# Patient Record
Sex: MALE | Race: BLACK OR AFRICAN AMERICAN | NOT HISPANIC OR LATINO | URBAN - METROPOLITAN AREA
[De-identification: names, ages, dates, MRNs, and addresses within clinical notes are randomized per-mention and may not be internally consistent; named-entity substitution may affect disease eponyms.]

---

## 2020-03-08 ENCOUNTER — INPATIENT (INPATIENT)
Facility: HOSPITAL | Age: 65
LOS: 0 days | Discharge: HOME | End: 2020-03-09
Attending: FAMILY MEDICINE | Admitting: FAMILY MEDICINE
Payer: COMMERCIAL

## 2020-03-08 VITALS
DIASTOLIC BLOOD PRESSURE: 90 MMHG | TEMPERATURE: 98 F | HEART RATE: 91 BPM | OXYGEN SATURATION: 97 % | RESPIRATION RATE: 18 BRPM | SYSTOLIC BLOOD PRESSURE: 152 MMHG | WEIGHT: 240.08 LBS

## 2020-03-08 LAB
ALBUMIN SERPL ELPH-MCNC: 4.3 G/DL — SIGNIFICANT CHANGE UP (ref 3.5–5.2)
ALP SERPL-CCNC: 74 U/L — SIGNIFICANT CHANGE UP (ref 30–115)
ALT FLD-CCNC: 14 U/L — SIGNIFICANT CHANGE UP (ref 0–41)
ANION GAP SERPL CALC-SCNC: 14 MMOL/L — SIGNIFICANT CHANGE UP (ref 7–14)
AST SERPL-CCNC: 15 U/L — SIGNIFICANT CHANGE UP (ref 0–41)
BASE EXCESS BLDV CALC-SCNC: 0.6 MMOL/L — SIGNIFICANT CHANGE UP (ref -2–2)
BILIRUB SERPL-MCNC: 0.6 MG/DL — SIGNIFICANT CHANGE UP (ref 0.2–1.2)
BUN SERPL-MCNC: 11 MG/DL — SIGNIFICANT CHANGE UP (ref 10–20)
CA-I SERPL-SCNC: 1.12 MMOL/L — SIGNIFICANT CHANGE UP (ref 1.12–1.3)
CALCIUM SERPL-MCNC: 8.8 MG/DL — SIGNIFICANT CHANGE UP (ref 8.5–10.1)
CHLORIDE SERPL-SCNC: 105 MMOL/L — SIGNIFICANT CHANGE UP (ref 98–110)
CO2 SERPL-SCNC: 21 MMOL/L — SIGNIFICANT CHANGE UP (ref 17–32)
CREAT SERPL-MCNC: 0.9 MG/DL — SIGNIFICANT CHANGE UP (ref 0.7–1.5)
FLU A RESULT: NEGATIVE — SIGNIFICANT CHANGE UP
FLU A RESULT: NEGATIVE — SIGNIFICANT CHANGE UP
FLUAV AG NPH QL: NEGATIVE — SIGNIFICANT CHANGE UP
FLUBV AG NPH QL: NEGATIVE — SIGNIFICANT CHANGE UP
GAS PNL BLDV: 140 MMOL/L — SIGNIFICANT CHANGE UP (ref 136–145)
GAS PNL BLDV: SIGNIFICANT CHANGE UP
GLUCOSE SERPL-MCNC: 102 MG/DL — HIGH (ref 70–99)
HCO3 BLDV-SCNC: 25 MMOL/L — SIGNIFICANT CHANGE UP (ref 22–29)
HCT VFR BLD CALC: 47 % — SIGNIFICANT CHANGE UP (ref 42–52)
HCT VFR BLDA CALC: 51.7 % — HIGH (ref 34–44)
HGB BLD CALC-MCNC: 16.9 G/DL — SIGNIFICANT CHANGE UP (ref 14–18)
HGB BLD-MCNC: 16 G/DL — SIGNIFICANT CHANGE UP (ref 14–18)
LACTATE BLDV-MCNC: 1.4 MMOL/L — SIGNIFICANT CHANGE UP (ref 0.5–1.6)
MCHC RBC-ENTMCNC: 30.2 PG — SIGNIFICANT CHANGE UP (ref 27–31)
MCHC RBC-ENTMCNC: 34 G/DL — SIGNIFICANT CHANGE UP (ref 32–37)
MCV RBC AUTO: 88.7 FL — SIGNIFICANT CHANGE UP (ref 80–94)
NRBC # BLD: 0 /100 WBCS — SIGNIFICANT CHANGE UP (ref 0–0)
PCO2 BLDV: 38 MMHG — LOW (ref 41–51)
PH BLDV: 7.42 — SIGNIFICANT CHANGE UP (ref 7.26–7.43)
PLATELET # BLD AUTO: 239 K/UL — SIGNIFICANT CHANGE UP (ref 130–400)
PO2 BLDV: 107 MMHG — HIGH (ref 20–40)
POTASSIUM BLDV-SCNC: 3.5 MMOL/L — SIGNIFICANT CHANGE UP (ref 3.3–5.6)
POTASSIUM SERPL-MCNC: 3.8 MMOL/L — SIGNIFICANT CHANGE UP (ref 3.5–5)
POTASSIUM SERPL-SCNC: 3.8 MMOL/L — SIGNIFICANT CHANGE UP (ref 3.5–5)
PROT SERPL-MCNC: 7.3 G/DL — SIGNIFICANT CHANGE UP (ref 6–8)
RBC # BLD: 5.3 M/UL — SIGNIFICANT CHANGE UP (ref 4.7–6.1)
RBC # FLD: 13.5 % — SIGNIFICANT CHANGE UP (ref 11.5–14.5)
RSV RESULT: NEGATIVE — SIGNIFICANT CHANGE UP
RSV RNA RESP QL NAA+PROBE: NEGATIVE — SIGNIFICANT CHANGE UP
SAO2 % BLDV: 98 % — SIGNIFICANT CHANGE UP
SODIUM SERPL-SCNC: 140 MMOL/L — SIGNIFICANT CHANGE UP (ref 135–146)
TROPONIN T SERPL-MCNC: <0.01 NG/ML — SIGNIFICANT CHANGE UP
WBC # BLD: 5.3 K/UL — SIGNIFICANT CHANGE UP (ref 4.8–10.8)
WBC # FLD AUTO: 5.3 K/UL — SIGNIFICANT CHANGE UP (ref 4.8–10.8)

## 2020-03-08 PROCEDURE — 93010 ELECTROCARDIOGRAM REPORT: CPT

## 2020-03-08 PROCEDURE — 99222 1ST HOSP IP/OBS MODERATE 55: CPT | Mod: GC,AI

## 2020-03-08 PROCEDURE — 71046 X-RAY EXAM CHEST 2 VIEWS: CPT | Mod: 26

## 2020-03-08 PROCEDURE — 99285 EMERGENCY DEPT VISIT HI MDM: CPT

## 2020-03-08 RX ORDER — IPRATROPIUM/ALBUTEROL SULFATE 18-103MCG
3 AEROSOL WITH ADAPTER (GRAM) INHALATION EVERY 6 HOURS
Refills: 0 | Status: DISCONTINUED | OUTPATIENT
Start: 2020-03-08 | End: 2020-03-09

## 2020-03-08 RX ORDER — ENOXAPARIN SODIUM 100 MG/ML
40 INJECTION SUBCUTANEOUS DAILY
Refills: 0 | Status: DISCONTINUED | OUTPATIENT
Start: 2020-03-08 | End: 2020-03-09

## 2020-03-08 RX ORDER — INFLUENZA VIRUS VACCINE 15; 15; 15; 15 UG/.5ML; UG/.5ML; UG/.5ML; UG/.5ML
0.5 SUSPENSION INTRAMUSCULAR ONCE
Refills: 0 | Status: COMPLETED | OUTPATIENT
Start: 2020-03-08 | End: 2020-03-08

## 2020-03-08 RX ORDER — IPRATROPIUM/ALBUTEROL SULFATE 18-103MCG
3 AEROSOL WITH ADAPTER (GRAM) INHALATION ONCE
Refills: 0 | Status: COMPLETED | OUTPATIENT
Start: 2020-03-08 | End: 2020-03-08

## 2020-03-08 RX ORDER — MAGNESIUM SULFATE 500 MG/ML
2 VIAL (ML) INJECTION ONCE
Refills: 0 | Status: COMPLETED | OUTPATIENT
Start: 2020-03-08 | End: 2020-03-08

## 2020-03-08 RX ORDER — LOSARTAN POTASSIUM 100 MG/1
100 TABLET, FILM COATED ORAL DAILY
Refills: 0 | Status: DISCONTINUED | OUTPATIENT
Start: 2020-03-08 | End: 2020-03-09

## 2020-03-08 RX ORDER — PANTOPRAZOLE SODIUM 20 MG/1
40 TABLET, DELAYED RELEASE ORAL
Refills: 0 | Status: DISCONTINUED | OUTPATIENT
Start: 2020-03-08 | End: 2020-03-09

## 2020-03-08 RX ORDER — LOSARTAN POTASSIUM 100 MG/1
1 TABLET, FILM COATED ORAL
Qty: 0 | Refills: 0 | DISCHARGE

## 2020-03-08 RX ADMIN — Medication 125 MILLIGRAM(S): at 06:09

## 2020-03-08 RX ADMIN — Medication 3 MILLILITER(S): at 21:02

## 2020-03-08 RX ADMIN — Medication 3 MILLILITER(S): at 06:05

## 2020-03-08 RX ADMIN — Medication 3 MILLILITER(S): at 05:35

## 2020-03-08 RX ADMIN — Medication 3 MILLILITER(S): at 06:42

## 2020-03-08 RX ADMIN — Medication 3 MILLILITER(S): at 05:50

## 2020-03-08 RX ADMIN — Medication 50 GRAM(S): at 08:37

## 2020-03-08 RX ADMIN — Medication 60 MILLIGRAM(S): at 17:15

## 2020-03-08 NOTE — H&P ADULT - NSHPPHYSICALEXAM_GEN_ALL_CORE
GENERAL: NAD, lying in bed comfortably  HEAD: Atraumatic, Normocephalic  EYES: EOMI, PERRLA, conjunctiva pink and cornea white  ENT: Normal external ears and nose, no discharges; moist mucous membranes, no erythema on posterior oropharynx  NECK: Supple, nontender to palpation; no JVD  CHEST/LUNG: Clear to auscultation bilaterally; No rales, rhonchi, wheezing, or rubs. Unlabored respirations  HEART: Regular rate and rhythm; No murmurs, rubs, or gallops  ABDOMEN: Soft, nontender, nondistended; no rebound tenderness, no guarding; no hepatomegaly; normoactive/hyperactive/hypoactive bowel sounds  EXTREMITIES:  2+ Peripheral Pulses, brisk capillary refill. No clubbing, cyanosis, or petal edema  NERVOUS SYSTEM: Alert and oriented to person, time, place and situation, speech clear. No focal deficits   MSK: FROM all 4 extremities, full and equal strength  SKIN: No rashes or lesions GENERAL: NAD, lying in bed comfortably  HEAD: Atraumatic, Normocephalic  EYES: EOMI, PERRLA, conjunctiva pink and cornea white  ENT: Normal external ears and nose, no discharges; moist mucous membranes, no erythema on posterior oropharynx  NECK: Supple, nontender to palpation; no JVD  CHEST/LUNG: Wheezing bilaterally  HEART: Regular rate and rhythm; No murmurs, rubs, or gallops  ABDOMEN: Obese abdomen, soft, nontender, nondistended; no rebound tenderness, no guarding; no hepatomegaly; normoactive bowel sounds  EXTREMITIES:  2+ Peripheral Pulses, brisk capillary refill. No clubbing, cyanosis, or petal edema; black vertical stripe on right middle finger  NERVOUS SYSTEM: Alert and oriented to person, time, place and situation, speech clear. No focal deficits   MSK: FROM all 4 extremities, full and equal strength  SKIN: No rashes or lesions

## 2020-03-08 NOTE — ED PROVIDER NOTE - CLINICAL SUMMARY MEDICAL DECISION MAKING FREE TEXT BOX
pt seen for wheezing and SOB x 3 days, pt improved with nebs and steroids but continued to wheeze diffusely, admitted for further treatment

## 2020-03-08 NOTE — ED ADULT NURSE NOTE - OBJECTIVE STATEMENT
md· HPI Objective Statement: 64y M w/ PMH of asthma presents with SOB for 3 days. States has been having cough for the past 3 days. Steadily developed SOB that was worse tonight. Called EMS. EMS gave 1 combi en route. Denies fever, chills, nasal congestion, sore throat, CP, abd pain, n/v/d, or numbness/tingling.

## 2020-03-08 NOTE — H&P ADULT - NSHPREVIEWOFSYSTEMS_GEN_ALL_CORE
CONSTITUTIONAL: No unintentional weight loss; no weakness; no fevers or chills  RESPIRATORY: No cough, wheezing, hemoptysis; no shortness of breath/shortness of breath on exertion; no orthopnea  CARDIOVASCULAR: No chest pain or palpitations  GASTROINTESTINAL: No abdominal or epigastric pain; no change in appetite; no early satiety; no nausea, vomiting, or hematemesis; no diarrhea or constipation; no melena or hematochezia; no change of stool caliber  GENITOURINARY: No dysuria, increased in urinary frequency or hematuria; no urethral discharge  NEUROLOGICAL: No headache/dizziness; no focal numbness or motor weakness  SKIN: No itching, rashes; no recent insect bites CONSTITUTIONAL: No unintentional weight loss; no weakness; no fevers or chills  RESPIRATORY: Reports cough, wheezing, and shortness of breath; no hemoptysis, no orthopnea  CARDIOVASCULAR: No chest pain or palpitations  GASTROINTESTINAL: No abdominal or epigastric pain; no change in appetite; no early satiety; no nausea, vomiting, or hematemesis; no diarrhea or constipation; no melena or hematochezia; no change of stool caliber  GENITOURINARY: No dysuria, increased in urinary frequency or hematuria; no urethral discharge  NEUROLOGICAL: No headache/dizziness; no focal numbness or motor weakness  SKIN: No itching, rashes; no recent insect bites

## 2020-03-08 NOTE — ED PROVIDER NOTE - NS ED ROS FT
Eyes:  No visual changes, eye pain or discharge.  ENMT:  No hearing changes, pain, no sore throat or runny nose, no difficulty swallowing  Cardiac:  No chest pain, SOB or edema. No chest pain with exertion.  Respiratory:  No respiratory distress. No hemoptysis. + history of asthma, cough  GI:  No nausea, vomiting, diarrhea or abdominal pain.  :  No dysuria, frequency or burning.  MS:  No myalgia, muscle weakness, joint pain or back pain.  Neuro:  No headache or weakness.  No LOC.  Skin:  No skin rash.   Endocrine: No history of thyroid disease or diabetes.

## 2020-03-08 NOTE — H&P ADULT - HISTORY OF PRESENT ILLNESS
64y M w/ PMH of asthma presents with SOB for 3 days. States has been having cough for the past 3 days. Steadily developed SOB that was worse tonight. Called EMS. EMS gave 1 combi en route. Denies fever, chills, nasal congestion, sore throat, CP, abd pain, n/v/d, or numbness/tingling.       T(C): 36.7 (08 Mar 2020 08:07), Max: 36.8 (08 Mar 2020 05:10)  T(F): 98 (08 Mar 2020 08:07), Max: 98.2 (08 Mar 2020 05:10)  HR: 87 (08 Mar 2020 08:07) (87 - 91)  BP: 150/90 (08 Mar 2020 08:07) (150/90 - 152/94)  RR: 17 (08 Mar 2020 08:07) (16 - 18)  SpO2: 98% (08 Mar 2020 08:07) (97% - 98%) 64 years old male from home with PMHx of asthma and HTN, presents with SOB for 3 days. Patient was well until Friday night, when he started to having difficulty breathing and mildly productive cough, with clear sputum, associated with wheezing and abdominal pain from excessive coughing. Patient denies fever and chills, chest pain, palpitation, n/v/d/c, urinary symptoms or leg swelling. Patient was not born with asthma, never hospitalized or intubated for asthma, and not steroid dependent. His last asthma exacerbation was 1 year and half ago and treated in Urgent care. He reports increased albuterol use for the past 3 days (usually use it 2-3 times per week but now almost around the clock, no nightly symptoms).    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 He could not tell if any passengers were sick, but none of his co-workers were. He also did not travel to any endemic area in the past 2 weeks. No pets at home.      T(C): 36.7 (08 Mar 2020 08:07), Max: 36.8 (08 Mar 2020 05:10)  T(F): 98 (08 Mar 2020 08:07), Max: 98.2 (08 Mar 2020 05:10)  HR: 87 (08 Mar 2020 08:07) (87 - 91)  BP: 150/90 (08 Mar 2020 08:07) (150/90 - 152/94)  RR: 17 (08 Mar 2020 08:07) (16 - 18)  SpO2: 98% (08 Mar 2020 08:07) (97% - 98%)

## 2020-03-08 NOTE — ED PROVIDER NOTE - OBJECTIVE STATEMENT
64y M w/ PMH of asthma presents with SOB for 3 days. States has been having cough for the past 3 days. Steadily developed SOB that was worse tonight. Called EMS. EMS gave 1 combi en route. Denies fever, chills, nasal congestion, sore throat, CP, abd pain, n/v/d, or numbness/tingling.

## 2020-03-08 NOTE — ED PROVIDER NOTE - PHYSICAL EXAMINATION
CONSTITUTIONAL: Well-developed; well-nourished; in no acute distress.   SKIN: warm, dry  HEAD: Normocephalic; atraumatic.  EYES: PERRL, EOMI, no conjunctival erythema  ENT: No nasal discharge; airway clear.  NECK: Supple; non tender.  CARD: S1, S2 normal; no murmurs, gallops, or rubs. Regular rate and rhythm.   RESP: Moderate expiratory wheeze diffusely.  ABD: soft ntnd  EXT: Normal ROM.  No clubbing, cyanosis or edema.   LYMPH: No acute cervical adenopathy.  NEURO: Alert, oriented, grossly unremarkable  PSYCH: Cooperative, appropriate.

## 2020-03-08 NOTE — H&P ADULT - NSHPATTENDINGPLANDISCUSS_GEN_ALL_CORE
the patient at bedside Patient care/ discussed/ chart reviewed w/ Dr. Tom and agree w/ plan, subject to possible change during admission

## 2020-03-08 NOTE — ED PROVIDER NOTE - ATTENDING CONTRIBUTION TO CARE
65 yo male with PMH asthma presents c/o cough x several days and SOB that started today. Felt like he was wheezing diffusely and inhaler not helping sx. Denies any fevers,  chills, CP or palpitations. No known sick contacts or recent travel.     VITAL SIGNS: noted  CONSTITUTIONAL: Well-developed; well-nourished; in no acute distress  HEAD: Normocephalic; atraumatic  EYES: PERRL, EOM intact; conjunctiva and sclera clear  ENT: No nasal discharge; airway clear. MMM  NECK: Supple; non tender. No anterior cervical lymphadenopathy noted  CARD: S1, S2 normal; no murmurs, gallops, or rubs. Regular rate and rhythm  RESP: +diffuse wheezing  ABD: Normal bowel sounds; soft; non-distended; non-tender; no hepatosplenomegaly. No CVA tenderness  EXT: Normal ROM. No calf tenderness or edema. Distal pulses intact  NEURO: Alert, oriented. Grossly unremarkable. No focal deficits  SKIN: Skin exam is warm and dry  MS: No midline spinal tenderness

## 2020-03-08 NOTE — H&P ADULT - ATTENDING COMMENTS
63 yo M pt w/ a hx of asthma p/w dyspnea x 3 days. Started having SOB on 03/06/2020 after returning from work. Associated with coughing productive of clear phlegm and wheezing. Increased inhaler use provided only moderate relief. Denies fevers, chills, nausea, vomiting and diarrhea. Denies prior hospitalization for asthma and denies ever being intubated (did seek care at an Urgent Care Center 2 years ago). Works for the [employer] and speculates that dust and environmental exposures as well as the change in weather precipitated his symptoms. At baseline, he rarely has asthma symptoms: has weeks without any day or night time symptoms. No recent travel history. Does not recall any sick contacts. Former smoker (20 pack yr).     Exam:  Normocephalic; atraumatic; PERRLA; EOMI; no oropharyngeal ulcers or exudates  Lungs w/ diffuse wheezes throughout (mainly end expiratory)  RRR; S1 and S2 w/o rubs, murmurs or gallops; no JVD  BS+; abd soft and non-tender to palpation; LE’s non-edematous  Obese body habitus    Impression:  Asthma exacerbation: speculate underlying mild persistent asthma  Hypertension  Obesity    Plan:  Monitor pulmonary status (still with end expiratory wheezing at the time of this exam)  Monitor peak flow daily  For now c/w systemic steroids and short acting beta agonist (ANDRES) - around the clock and PRN  Once wheezing improves, can add inhaled corticosteroid and d/c plan on ICS, ANDRES (PRN) and a short course of prednisone  C/w losartan 100 mg daily (home medication)  Discussed asthma action plan with the patient  Counseled patient in regards to the importance of regular diet, exercise and weight loss  Code status: full code

## 2020-03-08 NOTE — H&P ADULT - ASSESSMENT
# Asthma exacerbation      DVT ppx:  GI ppx:  Diet:  Activity:  Lines:  Code status:  Dispo: 64 years old male from home with PMHx of asthma and HTN, presents with SOB for 3 days. Admitted for asthma exacerbation.    # Asthma exacerbation on chronic mild asthma  - Mild asthma (no nightly symptoms, less than 2 days/week with symptoms, no exacerbation in the past year)  - CXR shows no focal consolidation or pulmonary congestion  - S/p Solumedrol, Mag, Duoneb x 4, still has mild wheezing on exam, but breathing comfortably on RA  - Continue Solumedrol 60mg q12hrs, Duonebs q6hrs and PRN, consider adding ICS when stable?  - Check Flu/RSV/RVP (patient did not get flu shot last year)  - Pulm consult if no improvement  - Might need outpatient PFTs     # HTN  - Stable  - Continue losartan 100mg q24hrs      DVT ppx: Lovenox  GI ppx: Protonix  Diet: DASH  Activity: Increase as tolerated  Lines: Peripheral IVs  Code status: Full code  Dispo: acute, likely d/c in 24-48 hrs if medically stable

## 2020-03-08 NOTE — H&P ADULT - NSHPSOCIALHISTORY_GEN_ALL_CORE
Former smoker of 20 years, 1/4 PPD, quit 6 years ago  Occasional alcohol use  No drug use      Lives at home, functionally independent

## 2020-03-08 NOTE — H&P ADULT - NSHPLABSRESULTS_GEN_ALL_CORE
16.0   5.30  )-----------( 239      ( 08 Mar 2020 05:45 )             47.0       03-08    140  |  105  |  11  ----------------------------<  102<H>  3.8   |  21  |  0.9    Ca    8.8      08 Mar 2020 05:45    TPro  7.3  /  Alb  4.3  /  TBili  0.6  /  DBili  x   /  AST  15  /  ALT  14  /  AlkPhos  74  03-08          CARDIAC MARKERS ( 08 Mar 2020 05:45 )  x     / <0.01 ng/mL / x     / x     / x      < from: Xray Chest 2 Views PA/Lat (03.08.20 @ 06:39) >    No radiographic evidence of acute cardiopulmonary disease.    < end of copied text >

## 2020-03-09 VITALS
HEART RATE: 65 BPM | TEMPERATURE: 97 F | RESPIRATION RATE: 18 BRPM | SYSTOLIC BLOOD PRESSURE: 151 MMHG | DIASTOLIC BLOOD PRESSURE: 77 MMHG

## 2020-03-09 LAB
ALBUMIN SERPL ELPH-MCNC: 4.3 G/DL — SIGNIFICANT CHANGE UP (ref 3.5–5.2)
ALP SERPL-CCNC: 65 U/L — SIGNIFICANT CHANGE UP (ref 30–115)
ALT FLD-CCNC: 14 U/L — SIGNIFICANT CHANGE UP (ref 0–41)
ANION GAP SERPL CALC-SCNC: 11 MMOL/L — SIGNIFICANT CHANGE UP (ref 7–14)
AST SERPL-CCNC: 14 U/L — SIGNIFICANT CHANGE UP (ref 0–41)
BASOPHILS # BLD AUTO: 0.01 K/UL — SIGNIFICANT CHANGE UP (ref 0–0.2)
BASOPHILS NFR BLD AUTO: 0.1 % — SIGNIFICANT CHANGE UP (ref 0–1)
BILIRUB SERPL-MCNC: 0.2 MG/DL — SIGNIFICANT CHANGE UP (ref 0.2–1.2)
BUN SERPL-MCNC: 14 MG/DL — SIGNIFICANT CHANGE UP (ref 10–20)
CALCIUM SERPL-MCNC: 9.2 MG/DL — SIGNIFICANT CHANGE UP (ref 8.5–10.1)
CHLORIDE SERPL-SCNC: 104 MMOL/L — SIGNIFICANT CHANGE UP (ref 98–110)
CO2 SERPL-SCNC: 23 MMOL/L — SIGNIFICANT CHANGE UP (ref 17–32)
CREAT SERPL-MCNC: 0.8 MG/DL — SIGNIFICANT CHANGE UP (ref 0.7–1.5)
EOSINOPHIL # BLD AUTO: 0 K/UL — SIGNIFICANT CHANGE UP (ref 0–0.7)
EOSINOPHIL NFR BLD AUTO: 0 % — SIGNIFICANT CHANGE UP (ref 0–8)
GLUCOSE SERPL-MCNC: 121 MG/DL — HIGH (ref 70–99)
HCT VFR BLD CALC: 46.9 % — SIGNIFICANT CHANGE UP (ref 42–52)
HGB BLD-MCNC: 15.5 G/DL — SIGNIFICANT CHANGE UP (ref 14–18)
IMM GRANULOCYTES NFR BLD AUTO: 0.3 % — SIGNIFICANT CHANGE UP (ref 0.1–0.3)
LYMPHOCYTES # BLD AUTO: 1.33 K/UL — SIGNIFICANT CHANGE UP (ref 1.2–3.4)
LYMPHOCYTES # BLD AUTO: 18 % — LOW (ref 20.5–51.1)
MAGNESIUM SERPL-MCNC: 2.4 MG/DL — SIGNIFICANT CHANGE UP (ref 1.8–2.4)
MCHC RBC-ENTMCNC: 29 PG — SIGNIFICANT CHANGE UP (ref 27–31)
MCHC RBC-ENTMCNC: 33 G/DL — SIGNIFICANT CHANGE UP (ref 32–37)
MCV RBC AUTO: 87.8 FL — SIGNIFICANT CHANGE UP (ref 80–94)
MONOCYTES # BLD AUTO: 0.54 K/UL — SIGNIFICANT CHANGE UP (ref 0.1–0.6)
MONOCYTES NFR BLD AUTO: 7.3 % — SIGNIFICANT CHANGE UP (ref 1.7–9.3)
NEUTROPHILS # BLD AUTO: 5.47 K/UL — SIGNIFICANT CHANGE UP (ref 1.4–6.5)
NEUTROPHILS NFR BLD AUTO: 74.3 % — SIGNIFICANT CHANGE UP (ref 42.2–75.2)
NRBC # BLD: 0 /100 WBCS — SIGNIFICANT CHANGE UP (ref 0–0)
PLATELET # BLD AUTO: 244 K/UL — SIGNIFICANT CHANGE UP (ref 130–400)
POTASSIUM SERPL-MCNC: 4.8 MMOL/L — SIGNIFICANT CHANGE UP (ref 3.5–5)
POTASSIUM SERPL-SCNC: 4.8 MMOL/L — SIGNIFICANT CHANGE UP (ref 3.5–5)
PROT SERPL-MCNC: 7 G/DL — SIGNIFICANT CHANGE UP (ref 6–8)
RBC # BLD: 5.34 M/UL — SIGNIFICANT CHANGE UP (ref 4.7–6.1)
RBC # FLD: 13.5 % — SIGNIFICANT CHANGE UP (ref 11.5–14.5)
SODIUM SERPL-SCNC: 138 MMOL/L — SIGNIFICANT CHANGE UP (ref 135–146)
WBC # BLD: 7.37 K/UL — SIGNIFICANT CHANGE UP (ref 4.8–10.8)
WBC # FLD AUTO: 7.37 K/UL — SIGNIFICANT CHANGE UP (ref 4.8–10.8)

## 2020-03-09 PROCEDURE — 99223 1ST HOSP IP/OBS HIGH 75: CPT

## 2020-03-09 RX ORDER — GUAIFENESIN/DEXTROMETHORPHAN 600MG-30MG
10 TABLET, EXTENDED RELEASE 12 HR ORAL
Qty: 0 | Refills: 0 | DISCHARGE
Start: 2020-03-09

## 2020-03-09 RX ORDER — GUAIFENESIN/DEXTROMETHORPHAN 600MG-30MG
10 TABLET, EXTENDED RELEASE 12 HR ORAL
Qty: 300 | Refills: 0
Start: 2020-03-09 | End: 2020-03-13

## 2020-03-09 RX ORDER — GUAIFENESIN/DEXTROMETHORPHAN 600MG-30MG
10 TABLET, EXTENDED RELEASE 12 HR ORAL EVERY 4 HOURS
Refills: 0 | Status: DISCONTINUED | OUTPATIENT
Start: 2020-03-09 | End: 2020-03-09

## 2020-03-09 RX ORDER — ALBUTEROL 90 UG/1
2 AEROSOL, METERED ORAL
Qty: 1 | Refills: 0
Start: 2020-03-09 | End: 2020-04-07

## 2020-03-09 RX ORDER — BUDESONIDE AND FORMOTEROL FUMARATE DIHYDRATE 160; 4.5 UG/1; UG/1
1 AEROSOL RESPIRATORY (INHALATION)
Qty: 28 | Refills: 0
Start: 2020-03-09 | End: 2020-03-22

## 2020-03-09 RX ORDER — PANTOPRAZOLE SODIUM 20 MG/1
1 TABLET, DELAYED RELEASE ORAL
Qty: 5 | Refills: 0
Start: 2020-03-09 | End: 2020-03-13

## 2020-03-09 RX ORDER — ALBUTEROL 90 UG/1
2 AEROSOL, METERED ORAL
Qty: 0 | Refills: 0 | DISCHARGE

## 2020-03-09 RX ADMIN — ENOXAPARIN SODIUM 40 MILLIGRAM(S): 100 INJECTION SUBCUTANEOUS at 11:52

## 2020-03-09 RX ADMIN — Medication 60 MILLIGRAM(S): at 06:04

## 2020-03-09 RX ADMIN — Medication 40 MILLIGRAM(S): at 14:12

## 2020-03-09 RX ADMIN — Medication 3 MILLILITER(S): at 08:20

## 2020-03-09 RX ADMIN — Medication 10 MILLILITER(S): at 10:11

## 2020-03-09 RX ADMIN — Medication 5 MILLILITER(S): at 06:03

## 2020-03-09 RX ADMIN — PANTOPRAZOLE SODIUM 40 MILLIGRAM(S): 20 TABLET, DELAYED RELEASE ORAL at 06:05

## 2020-03-09 RX ADMIN — LOSARTAN POTASSIUM 100 MILLIGRAM(S): 100 TABLET, FILM COATED ORAL at 06:03

## 2020-03-09 RX ADMIN — Medication 10 MILLILITER(S): at 13:40

## 2020-03-09 NOTE — DISCHARGE NOTE PROVIDER - CARE PROVIDER_API CALL
Wayne Stern)  Internal Medicine; Pulmonary Disease  [address] [place] [address], [place] [address]  Phone: [phone]  Fax: [phone]  Follow Up Time: 1 week

## 2020-03-09 NOTE — PROGRESS NOTE ADULT - SUBJECTIVE AND OBJECTIVE BOX
Patient is a 64y old  Male who presents with a chief complaint of Asthma exacerbation (08 Mar 2020 09:58)      OVERNIGHT EVENTS: no acute events overnight    SUBJECTIVE / INTERVAL HPI: Patient seen and examined at bedside. he is no more complaining of any sob, No chest pain, no fever or chills    VITAL SIGNS:  Vital Signs Last 24 Hrs  T(C): 36.4 (09 Mar 2020 04:30), Max: 36.4 (09 Mar 2020 04:30)  T(F): 97.5 (09 Mar 2020 04:30), Max: 97.5 (09 Mar 2020 04:30)  HR: 74 (09 Mar 2020 04:30) (73 - 88)  BP: 135/80 (09 Mar 2020 04:30) (135/80 - 162/74)  BP(mean): --  RR: 18 (09 Mar 2020 04:30) (18 - 20)  SpO2: 97% (08 Mar 2020 15:48) (97% - 97%)    PHYSICAL EXAM:    General: WDWN  HEENT: NC/AT; PERRL, clear conjunctiva  Neck: supple  Cardiovascular: +S1/S2; RRR  Respiratory: CTA b/l; no W/R/R  Gastrointestinal: soft, NT/ND; +BSx4  Extremities: WWP; 2+ peripheral pulses; no edema   Neurological: AAOx3; no focal deficits    MEDICATIONS:  MEDICATIONS  (STANDING):  enoxaparin Injectable 40 milliGRAM(s) SubCutaneous daily  guaifenesin/dextromethorphan  Syrup 10 milliLiter(s) Oral every 4 hours  influenza   Vaccine 0.5 milliLiter(s) IntraMuscular once  losartan 100 milliGRAM(s) Oral daily  pantoprazole    Tablet 40 milliGRAM(s) Oral before breakfast  predniSONE   Tablet 40 milliGRAM(s) Oral daily    MEDICATIONS  (PRN):  albuterol/ipratropium for Nebulization. 3 milliLiter(s) Nebulizer every 6 hours PRN Shortness of Breath and/or Wheezing      ALLERGIES:  Allergies    No Known Allergies    Intolerances        LABS:                        15.5   7.37  )-----------( 244      ( 09 Mar 2020 06:06 )             46.9     03-09    138  |  104  |  14  ----------------------------<  121<H>  4.8   |  23  |  0.8    Ca    9.2      09 Mar 2020 06:06  Mg     2.4     03-09    TPro  7.0  /  Alb  4.3  /  TBili  0.2  /  DBili  x   /  AST  14  /  ALT  14  /  AlkPhos  65  03-09        CAPILLARY BLOOD GLUCOSE

## 2020-03-09 NOTE — DISCHARGE NOTE PROVIDER - HOSPITAL COURSE
64 years old male from home with PMHx of asthma and HTN, presents with SOB for 3 days. Patient was well until Friday night, when he started to having difficulty breathing and mildly productive cough, with clear sputum, associated with wheezing and abdominal pain from excessive coughing.    CXR is negative for any acute lung infection,     RVP is negative for Flu and RSV. patient started on solumedrol and bronchodilators    will be discharged home on prednisone 40 mg po od + bronchodilators     f/u with PMD 64 years old male from home with PMHx of asthma and HTN, presents with SOB for 3 days. Patient was well until Friday night, when he started to having difficulty breathing and mildly productive cough, with clear sputum, associated with wheezing     CXR is negative for any acute lung infection,     RVP is negative for Flu and RSV. patient started on solumedrol and bronchodilators    will be discharged home on prednisone 40 mg po od + bronchodilators     f/u with PMD

## 2020-03-09 NOTE — DISCHARGE NOTE PROVIDER - NSDCFUADDAPPT_GEN_ALL_CORE_FT
Please follow up with your doctor in 2-3 days  Please follow up with pulmonary in one week  Please come back to the hospital if you develop any further shortness of breath, chest pain, persistent cough or any new symptoms or concerns

## 2020-03-09 NOTE — DISCHARGE NOTE PROVIDER - NSDCMRMEDTOKEN_GEN_ALL_CORE_FT
albuterol 90 mcg/inh inhalation aerosol: 2 puff(s) inhaled every 6 hours, As Needed  losartan 100 mg oral tablet: 1 tab(s) orally once a day  predniSONE 20 mg oral tablet: 2 tab(s) orally once a day  Protonix 40 mg oral delayed release tablet: 1 tab(s) orally once a day (before a meal)  Symbicort 160 mcg-4.5 mcg/inh inhalation aerosol: 1 puff(s) inhaled 2 times a day   Tussin DM 10 mg-100 mg/5 mL oral liquid: 10 milliliter(s) orally every 4 hours

## 2020-03-09 NOTE — DISCHARGE NOTE PROVIDER - NSDCCPCAREPLAN_GEN_ALL_CORE_FT
PRINCIPAL DISCHARGE DIAGNOSIS  Diagnosis: Asthma  Assessment and Plan of Treatment: you were diagnosed with asthma exacerbation secondary to a viral bronchitis  you were treated with prednsione and bronchodilators   you need to follow up with your primary care doctor   you will be discharged on 5 days course of oral prednisone and bronchodilaors

## 2020-03-09 NOTE — DISCHARGE NOTE NURSING/CASE MANAGEMENT/SOCIAL WORK - PATIENT PORTAL LINK FT
You can access the FollowMyHealth Patient Portal offered by [facility] by registering at the following website: [email or web address]. By joining [employer]’s FollowMyHealth portal, you will also be able to view your health information using other applications (apps) compatible with our system.

## 2020-03-09 NOTE — PROGRESS NOTE ADULT - ASSESSMENT
64 years old male from home with PMHx of asthma and HTN, presents with SOB for 3 days. Admitted for asthma exacerbation.    # Asthma exacerbation on chronic mild asthma  - Mild asthma (no nightly symptoms, less than 2 days/week with symptoms, no exacerbation in the past year)  - CXR shows no focal consolidation or pulmonary congestion  - will switch him to prednisone 40 mg po od for 5 more days  - WILL keep on duonebs and bronchodilators  - consider adding LABA + ICS on discharge ( symbicort)   - FLU / RSV neg  - Might need outpatient PFTs     # HTN  - Stable  - Continue losartan 100mg q24hrs      DVT ppx: Lovenox  GI ppx: Protonix  Diet: DASH  Activity: Increase as tolerated  Lines: Peripheral IVs  Code status: Full code  Dispo: will discharge in 24 hrs if clinically and hemodynamically stable

## 2020-03-10 LAB
HCV AB S/CO SERPL IA: 0.1 S/CO — SIGNIFICANT CHANGE UP (ref 0–0.99)
HCV AB SERPL-IMP: SIGNIFICANT CHANGE UP
RAPID RVP RESULT: SIGNIFICANT CHANGE UP

## 2020-03-13 DIAGNOSIS — J20.8 ACUTE BRONCHITIS DUE TO OTHER SPECIFIED ORGANISMS: ICD-10-CM

## 2020-03-13 DIAGNOSIS — I10 ESSENTIAL (PRIMARY) HYPERTENSION: ICD-10-CM

## 2020-03-13 DIAGNOSIS — J45.31 MILD PERSISTENT ASTHMA WITH (ACUTE) EXACERBATION: ICD-10-CM

## 2020-03-13 DIAGNOSIS — E66.9 OBESITY, UNSPECIFIED: ICD-10-CM

## 2023-12-09 NOTE — PATIENT PROFILE ADULT - NSASFUNCLEVELADLAMBULATE_GEN_A_NUR
[facility] Pharmacist Visit - Diabetes Management  Referring Provider: Juliane Guzman MD    Subjective   Rui Shah is a 67 y.o. male who presents for Diabetes. Still having some nausea in the morning.    HPI  PMH significant for T2DM, HTN, GERD, depression, asthma, HLD, and MARIELA.  Patient has no known history of medullary thyroid cancer or pancreatitis. Previous UTIs.  Diagnosed 6-7 years ago. Known DM complications include peripheral neuropathy.  Special needs/barriers to therapy: none    Lifestyle  Watching food labels and trying to make healthier choices    Diet: 3 meals/day.  BK: small meal  LN: Couple sandwiches  DN: Chicken or hamburgers w/ green beans or broccoli when his wife cooks. Takeout 2-3 times per week  Snacks: Trail mix, fruit  Drinks: Water, occasionally diet soda     Physical Activity: Limited due to ankle surgery, doing PT    Diabetes Management  Current Medications: Metformin XR 500mg 2 tabs in the morning and 2 tabs in the evening, Jardiance 25 mg daily  Previous Medications: Trulicity (vomiting)     Adherence: Takes medication as directed and reports no missed doses  Adverse Effects: none    Risk Reducing Meds  On ACEi/ARB: Yes   On SGLT2i: Yes  On GLP1-RA: No (vomiting)  On Statin: Yes     Glucose Monitoring  Glucometer/CGM Type: One Touch Ultra 2     Previous home BG readings: - 149    Current home BG readings: morning readings 100 - 200 (fasting mostly, some after breakfast) Today 161 Post prandial     Hypoglycemia: No BG readings < 80 mg/dL  Hyperglycemia: Polyuria (frequent urination) and Polydipsia (frequent thirst)    Diabetic Eye Exam: Needs completed, last summer 2022  Monofilament Foot Exam: Needs completed, last unknown      Secondary Prevention  ASCVD Risk:   The 10-year ASCVD risk score (Lamin LORENZ, et al., 2019) is: 19.8%    Values used to calculate the score:      Age: 67 years      Sex: Male      Is Non- : No      Diabetic: Yes      Tobacco smoker: No       Systolic Blood Pressure: 110 mmHg      Is BP treated: Yes      HDL Cholesterol: 57.8 mg/dL      Total Cholesterol: 162 mg/dL  On Statin?: Yes, rosuvastatin 10 mg     BP Readings from Last 3 Encounters:   11/28/23 110/64   11/07/23 122/78   10/10/23 138/82     Current HTN Regimen: losartan 25mg daily, diltiazem ER 240mg daily     HTN at goal? Yes    Immunizations Needed: Annual Flu, RSV, Prevnar, Shingrix, Tdap, and Hepatitis B Series  Tobacco Use: non-smoker      Objective   Vitals  BP Readings from Last 2 Encounters:   11/28/23 110/64   11/07/23 122/78     BMI Readings from Last 1 Encounters:   11/30/23 31.33 kg/m²      Labs  A1C  Lab Results   Component Value Date    HGBA1C 7.3 (H) 10/10/2023    HGBA1C 9.6 (A) 06/26/2023    HGBA1C 6.7 04/02/2019     BMP  Lab Results   Component Value Date    CALCIUM 9.6 10/10/2023     10/10/2023    K 4.0 10/10/2023    CO2 22 10/10/2023     10/10/2023    BUN 13 10/10/2023    CREATININE 0.79 10/10/2023    GFRMALE 85 06/26/2023     LFTs  Lab Results   Component Value Date    ALT 24 10/10/2023    AST 22 10/10/2023    ALKPHOS 135 10/10/2023    BILITOT 0.4 10/10/2023     FLP  Lab Results   Component Value Date    TRIG 96 11/28/2023    CHOL 162 11/28/2023    LDLF 69 12/19/2022    HDL 57.8 11/28/2023     Urine Microalbumin  Lab Results   Component Value Date    MICROALBCREA 201.6 (H) 11/28/2023     Vitamin B12  Lab Results   Component Value Date    [id number] 350 11/28/2023         Assessment/Plan   Problem List Items Addressed This Visit       Type 2 diabetes mellitus with diabetic neuropathy, without long-term current use of insulin (CMS/[facility])     Diabetes: Uncontrolled with last A1c 7.3% on 10/10/23 which is not at goal of <7%. Current regimen includes Metformin XR 500mg 2 tabs BID and Jardiance 25 mg daily. Patient states adherence and tolerating the medication. Patient is still experiencing some mild nausea in the morning which is relieved with his Zofran. Patient states  he is trying to make some dietary changes and focus on food labels more. Home blood sugars are usually taken in the morning before food ranging anywhere from 100-200 but does sometime test after breakfast. Patient states some of his blood sugars are true fasting and others after eating. Patient denies any symptoms of low blood sugars, but does have symptoms of high blood sugars including increased thirst and urination. Due to patient recently started on Jardiance (11/29), plan to make no medication changes today and focus on dietary changes.  Continue taking Metformin XR 500mg 2 tabs BID and Jardiance 25 mg daily  Limit intake of carbohydrates and sugars and increase intake of fruits, veggies, and proteins  Continue to monitor and record BG daily              Future Considerations: Consider addition of low dose sulfonylurea    Patient Education:  Counseled patient on relevant MOA, expectations, side effects, duration of therapy, contraindications, administration, and monitoring parameters.  All questions and concerns addressed. Contact pharmacist with any further questions or concerns prior to next appointment.  Reviewed dietary recommendations: Healthy Plate method  Reviewed BG goals: Fasting BG goal , Postprandial BG goal <180 mg/dL, A1C goal <7%    Clinical Pharmacist follow-up: 1/8/24 2 pm, Telehealth visit  Next PCP appointment: 3/1/24    Odilia ZunigaD  Clinical Pharmacist  12/11/23    Continue all meds under the continuation of care with the referring provider and clinical pharmacy team.  Verbal consent to manage patient's drug therapy was obtained from the patient. They were informed they may decline to participate or withdraw from participation in pharmacy services at any time.         0 = independent

## 2024-01-02 NOTE — ED ADULT NURSE NOTE - CHPI ED NUR SYMPTOMS POS
Alert and oriented to person, place, time/situation. normal mood and affect. no apparent risk to self or others. COUGH/chest pain on deep breath and coughing/PAIN/CONGESTION

## 2025-05-05 NOTE — PATIENT PROFILE ADULT - HAS THE PATIENT EXPERIENCED ANY OF THE FOLLOWING WITHIN THE WEEK PRIOR TO ADMISSION?
Please continue to use calamine lotion to help with itching, you may take Benadryl every 6 hours as needed to help with itching.  The rash can take up to 2 to 3 weeks to fully improve.  
no